# Patient Record
Sex: FEMALE | Race: WHITE | NOT HISPANIC OR LATINO
[De-identification: names, ages, dates, MRNs, and addresses within clinical notes are randomized per-mention and may not be internally consistent; named-entity substitution may affect disease eponyms.]

---

## 2022-02-02 ENCOUNTER — APPOINTMENT (OUTPATIENT)
Dept: ANTEPARTUM | Facility: CLINIC | Age: 32
End: 2022-02-02

## 2022-02-02 PROBLEM — Z00.00 ENCOUNTER FOR PREVENTIVE HEALTH EXAMINATION: Status: ACTIVE | Noted: 2022-02-02

## 2022-02-07 ENCOUNTER — APPOINTMENT (OUTPATIENT)
Dept: ANTEPARTUM | Facility: CLINIC | Age: 32
End: 2022-02-07
Payer: COMMERCIAL

## 2022-02-07 ENCOUNTER — ASOB RESULT (OUTPATIENT)
Age: 32
End: 2022-02-07

## 2022-02-07 PROCEDURE — 76819 FETAL BIOPHYS PROFIL W/O NST: CPT

## 2022-02-07 PROCEDURE — 76805 OB US >/= 14 WKS SNGL FETUS: CPT

## 2022-02-09 ENCOUNTER — OUTPATIENT (OUTPATIENT)
Dept: INPATIENT UNIT | Facility: HOSPITAL | Age: 32
LOS: 1 days | Discharge: HOME | End: 2022-02-09
Payer: COMMERCIAL

## 2022-02-09 ENCOUNTER — TRANSCRIPTION ENCOUNTER (OUTPATIENT)
Age: 32
End: 2022-02-09

## 2022-02-09 VITALS — HEART RATE: 95 BPM | DIASTOLIC BLOOD PRESSURE: 71 MMHG | SYSTOLIC BLOOD PRESSURE: 127 MMHG

## 2022-02-09 VITALS
TEMPERATURE: 99 F | SYSTOLIC BLOOD PRESSURE: 127 MMHG | RESPIRATION RATE: 16 BRPM | HEART RATE: 96 BPM | DIASTOLIC BLOOD PRESSURE: 87 MMHG

## 2022-02-09 DIAGNOSIS — O26.899 OTHER SPECIFIED PREGNANCY RELATED CONDITIONS, UNSPECIFIED TRIMESTER: ICD-10-CM

## 2022-02-09 DIAGNOSIS — Z3A.00 WEEKS OF GESTATION OF PREGNANCY NOT SPECIFIED: ICD-10-CM

## 2022-02-09 LAB
ALBUMIN SERPL ELPH-MCNC: 3.8 G/DL — SIGNIFICANT CHANGE UP (ref 3.3–5)
ALP SERPL-CCNC: 116 U/L — SIGNIFICANT CHANGE UP (ref 40–120)
ALT FLD-CCNC: 12 U/L — SIGNIFICANT CHANGE UP (ref 4–33)
ANION GAP SERPL CALC-SCNC: 10 MMOL/L — SIGNIFICANT CHANGE UP (ref 7–14)
APPEARANCE UR: ABNORMAL
APTT BLD: 25.9 SEC — LOW (ref 27–36.3)
AST SERPL-CCNC: 17 U/L — SIGNIFICANT CHANGE UP (ref 4–32)
BACTERIA # UR AUTO: NEGATIVE — SIGNIFICANT CHANGE UP
BASOPHILS # BLD AUTO: 0.04 K/UL — SIGNIFICANT CHANGE UP (ref 0–0.2)
BASOPHILS NFR BLD AUTO: 0.3 % — SIGNIFICANT CHANGE UP (ref 0–2)
BILIRUB SERPL-MCNC: 0.2 MG/DL — SIGNIFICANT CHANGE UP (ref 0.2–1.2)
BILIRUB UR-MCNC: NEGATIVE — SIGNIFICANT CHANGE UP
BUN SERPL-MCNC: 6 MG/DL — LOW (ref 7–23)
CALCIUM SERPL-MCNC: 9.1 MG/DL — SIGNIFICANT CHANGE UP (ref 8.4–10.5)
CHLORIDE SERPL-SCNC: 104 MMOL/L — SIGNIFICANT CHANGE UP (ref 98–107)
CO2 SERPL-SCNC: 22 MMOL/L — SIGNIFICANT CHANGE UP (ref 22–31)
COLOR SPEC: SIGNIFICANT CHANGE UP
CREAT ?TM UR-MCNC: 38 MG/DL — SIGNIFICANT CHANGE UP
CREAT SERPL-MCNC: 0.45 MG/DL — LOW (ref 0.5–1.3)
DIFF PNL FLD: NEGATIVE — SIGNIFICANT CHANGE UP
EOSINOPHIL # BLD AUTO: 0.06 K/UL — SIGNIFICANT CHANGE UP (ref 0–0.5)
EOSINOPHIL NFR BLD AUTO: 0.5 % — SIGNIFICANT CHANGE UP (ref 0–6)
EPI CELLS # UR: 1 /HPF — SIGNIFICANT CHANGE UP (ref 0–5)
FIBRINOGEN PPP-MCNC: 572 MG/DL — HIGH (ref 290–520)
GLUCOSE SERPL-MCNC: 83 MG/DL — SIGNIFICANT CHANGE UP (ref 70–99)
GLUCOSE UR QL: NEGATIVE — SIGNIFICANT CHANGE UP
HCT VFR BLD CALC: 33.3 % — LOW (ref 34.5–45)
HGB BLD-MCNC: 11.6 G/DL — SIGNIFICANT CHANGE UP (ref 11.5–15.5)
HYALINE CASTS # UR AUTO: 1 /LPF — SIGNIFICANT CHANGE UP (ref 0–7)
IANC: 9.65 K/UL — HIGH (ref 1.5–8.5)
IMM GRANULOCYTES NFR BLD AUTO: 0.8 % — SIGNIFICANT CHANGE UP (ref 0–1.5)
INR BLD: 0.94 RATIO — SIGNIFICANT CHANGE UP (ref 0.88–1.16)
KETONES UR-MCNC: NEGATIVE — SIGNIFICANT CHANGE UP
LDH SERPL L TO P-CCNC: 144 U/L — SIGNIFICANT CHANGE UP (ref 135–225)
LEUKOCYTE ESTERASE UR-ACNC: NEGATIVE — SIGNIFICANT CHANGE UP
LYMPHOCYTES # BLD AUTO: 1.81 K/UL — SIGNIFICANT CHANGE UP (ref 1–3.3)
LYMPHOCYTES # BLD AUTO: 14.7 % — SIGNIFICANT CHANGE UP (ref 13–44)
MCHC RBC-ENTMCNC: 29.8 PG — SIGNIFICANT CHANGE UP (ref 27–34)
MCHC RBC-ENTMCNC: 34.8 GM/DL — SIGNIFICANT CHANGE UP (ref 32–36)
MCV RBC AUTO: 85.6 FL — SIGNIFICANT CHANGE UP (ref 80–100)
MONOCYTES # BLD AUTO: 0.69 K/UL — SIGNIFICANT CHANGE UP (ref 0–0.9)
MONOCYTES NFR BLD AUTO: 5.6 % — SIGNIFICANT CHANGE UP (ref 2–14)
NEUTROPHILS # BLD AUTO: 9.65 K/UL — HIGH (ref 1.8–7.4)
NEUTROPHILS NFR BLD AUTO: 78.1 % — HIGH (ref 43–77)
NITRITE UR-MCNC: NEGATIVE — SIGNIFICANT CHANGE UP
NRBC # BLD: 0 /100 WBCS — SIGNIFICANT CHANGE UP
NRBC # FLD: 0 K/UL — SIGNIFICANT CHANGE UP
PH UR: 8 — SIGNIFICANT CHANGE UP (ref 5–8)
PLATELET # BLD AUTO: 281 K/UL — SIGNIFICANT CHANGE UP (ref 150–400)
POTASSIUM SERPL-MCNC: 4.1 MMOL/L — SIGNIFICANT CHANGE UP (ref 3.5–5.3)
POTASSIUM SERPL-SCNC: 4.1 MMOL/L — SIGNIFICANT CHANGE UP (ref 3.5–5.3)
PROT ?TM UR-MCNC: 6 MG/DL — SIGNIFICANT CHANGE UP
PROT ?TM UR-MCNC: 6 MG/DL — SIGNIFICANT CHANGE UP
PROT SERPL-MCNC: 6.5 G/DL — SIGNIFICANT CHANGE UP (ref 6–8.3)
PROT UR-MCNC: NEGATIVE — SIGNIFICANT CHANGE UP
PROT/CREAT UR-RTO: 0.2 RATIO — SIGNIFICANT CHANGE UP (ref 0–0.2)
PROTHROM AB SERPL-ACNC: 10.8 SEC — SIGNIFICANT CHANGE UP (ref 10.6–13.6)
RBC # BLD: 3.89 M/UL — SIGNIFICANT CHANGE UP (ref 3.8–5.2)
RBC # FLD: 12.9 % — SIGNIFICANT CHANGE UP (ref 10.3–14.5)
RBC CASTS # UR COMP ASSIST: 0 /HPF — SIGNIFICANT CHANGE UP (ref 0–4)
SODIUM SERPL-SCNC: 136 MMOL/L — SIGNIFICANT CHANGE UP (ref 135–145)
SP GR SPEC: 1.01 — SIGNIFICANT CHANGE UP (ref 1–1.05)
URATE SERPL-MCNC: 4 MG/DL — SIGNIFICANT CHANGE UP (ref 2.5–7)
UROBILINOGEN FLD QL: SIGNIFICANT CHANGE UP
WBC # BLD: 12.35 K/UL — HIGH (ref 3.8–10.5)
WBC # FLD AUTO: 12.35 K/UL — HIGH (ref 3.8–10.5)
WBC UR QL: 2 /HPF — SIGNIFICANT CHANGE UP (ref 0–5)

## 2022-02-09 PROCEDURE — 99203 OFFICE O/P NEW LOW 30 MIN: CPT

## 2022-02-09 RX ORDER — ACETAMINOPHEN 500 MG
1000 TABLET ORAL ONCE
Refills: 0 | Status: COMPLETED | OUTPATIENT
Start: 2022-02-09 | End: 2022-02-09

## 2022-02-09 RX ADMIN — Medication 1000 MILLIGRAM(S): at 12:30

## 2022-02-09 NOTE — OB RN TRIAGE NOTE - NS_GESTAGE_OBGYN_ALL_OB_FT
PT DAILY TREATMENT NOTE     Patient Name: Elisa Woods  Date:2019  : 1961  [x]  Patient  Verified  Payor: Brianna Screws / Plan: [employer]  CAPITATED PT / Product Type: Commerical /    In time: 9:32 am       Out time: 10:55 am  Total Treatment Time (min):  83  Visit #: 12 of     Treatment Area: Knee pain, right [M25.561]  Acute postoperative pain [G89.18]    SUBJECTIVE  Pain Level (0-10 scale): 1  Any medication changes, allergies to medications, adverse drug reactions, diagnosis change, or new procedure performed?: [x] No    [] Yes (see summary sheet for update)  Subjective functional status/changes:   [] No changes reported  \"Doing so much better than I did before. I go back to work on Wednesday. \"    OBJECTIVE  Modality rationale: decrease inflammation and decrease pain to improve the patients ability to ambulate, stairs , squat   Min Type Additional Details   10 [x]  Vasopneumatic Device Pressure:       [] lo [x] med [] hi   Temperature: [x] lo [] med [] hi   [x] Skin assessment post-treatment:  [x]intact []redness- no adverse reaction       []redness - adverse reaction:     61 min Therapeutic Exercise:  [x] See flow sheet: (-12 minutes for FOTO completion)   Rationale: increase ROM, increase strength and improve coordination to improve the patients ability to ambulate, stairs, drive, dressing    12 min Manual Therapy: tibial mobs grade IV; (R) knee PROM with PNF stretching of posterior chain; stick-rolling STM to RF; reassessment   Rationale: decrease pain, increase ROM and increase tissue extensibility to improve movement for gait, ADLs, stairs         X min Patient Education: [x] Review HEP     Other Objective/Functional Measures:   Subjective improvement of 95% in symptoms since IE reported.   Improvements: gait quality, ADLs, \"I can pretty much do everything\", transfers  Deficits: stair negotiation (descent>ascent), ice skating, normalized gait  FOTO score: 61/100 (at last assessment, 35/100)  (R) knee A/PROM: 3-120 deg  (R) knee strength: 5/5 within available ROM  (R) hip strength: flex 4+/5, abd 4/5, ext 5/5 (glut max 4+/5)  Core strength: 100% bridge  Edema via girth measurements:    @ mid-patella 40.5 cm   @ 2 inches above patella 41 cm   @ 2 inches below patella 36.5 cm  Pain: (B) 0, (W) 3    Pain Level (0-10 scale) post treatment: 1    ASSESSMENT/Changes in Function:   See PN. Patient will continue to benefit from skilled PT services to modify and progress therapeutic interventions, address functional mobility deficits, address ROM deficits, address strength deficits, analyze and address soft tissue restrictions, analyze and cue movement patterns, analyze and modify body mechanics/ergonomics, assess and modify postural abnormalities, address imbalance/dizziness and instruct in home and community integration to attain remaining goals. []  See Plan of Care  [x]  See progress note/recertification (4/75/35)  []  See Discharge Summary         Progress towards goals / Updated goals:  1) Patient  independent with HEP. -Goal met; pt notes strict HEP compliance  2) Patient will improve R knee AROM to 0-120 degrees to facilitate gait, transfers, driving. -Goal progressing; knee AROM 3-120 deg  3) Increase FOTO to 62 indicating improved function and quality of life. -Goal near met; 61/100 FOTO score  4) Patient will increase R knee strength in quad to 5 so patient has improved ability to descend steps reciprocal, and ambulate with TKE and normal gait.  -Goal met; pt demos 5/5 knee EXT strength   5) Patient able to stand for >1hr without AD to progress towards return to work. -Goal met; pt notes ability to stand > 1 hour    PLAN  [x]  Upgrade activities as tolerated     [x]  Continue plan of care  []  Update interventions per flow sheet       []  Discharge due to:_  [x]  Other: see PN; cont for remaining scheduled treatments, then decrease frequency to 1x weekly for 4 weeks total    Amirah Bee Ivy Hilton, PTA  4/23/2019 32w4d

## 2022-02-09 NOTE — OB PROVIDER TRIAGE NOTE - HISTORY OF PRESENT ILLNESS
's patient is a 32 y/o EDC 2022 EGA 32   reports of headache for last couple days. At this time patient reports headache as 6 out 10 on numeric pain scale. Took Tylenol over 24 hours ago. Patient denies visual disturbances, nausea/vomiting, right upper quadrant pain. Patient reports of fetal movement. Denies loss of fluid, vaginal bleeding, cramping, and/or contractions.     AP complications: TIUP, 8 week demise of a twin in this pregnancy  Covid: positive on 2022, reports at that time having headache, body ache, congestation  Medical History: TMJ  Surgical History: Denies  OBGYN History: HPV, coloposcopy at 20 y/o   Mental History: Pandemic related, anxiety, hyperventilation, and urology consult for painful urination ( pt reports anxiety manifested in urinary symptoms)   's patient is a 30 y/o EDC 2022 EGA 32   reports of headache for last couple days. At this time patient reports headache as 6 out 10 on numeric pain scale. Took Tylenol over 24 hours ago. Patient denies visual disturbances, nausea/vomiting, right upper quadrant pain. Patient reports of fetal movement. Denies loss of fluid, vaginal bleeding, cramping, and/or contractions.     AP complications: TIUP, 8 week demise of a twin in this pregnancy  ATU sono 2022: cephalic presentation, anterior placenta, no previa, JUAN ANTONIO 12.47,  BPP, EFW 1895 (32%tile)  Covid: positive on 2022, reports at that time having headache, body ache, congestation  Medical History: TMJ  Surgical History: Denies  OBGYN History: HPV, coloposcopy at 20 y/o   Mental History: Pandemic related, anxiety, hyperventilation, and urology consult for painful urination ( pt reports anxiety manifested in urinary symptoms)

## 2022-02-09 NOTE — OB RN TRIAGE NOTE - FALL HARM RISK - UNIVERSAL INTERVENTIONS
Bed in lowest position, wheels locked, appropriate side rails in place/Call bell, personal items and telephone in reach/Instruct patient to call for assistance before getting out of bed or chair/Non-slip footwear when patient is out of bed/[place] to call system/Physically safe environment - no spills, clutter or unnecessary equipment/Purposeful Proactive Rounding/Room/bathroom lighting operational, light cord in reach

## 2022-02-09 NOTE — OB PROVIDER TRIAGE NOTE - NSHPPHYSICALEXAM_GEN_ALL_CORE
Vital Signs Last 24 Hrs  T(C): 37.2 (09 Feb 2022 09:46), Max: 37.2 (09 Feb 2022 09:46)  T(F): 99 (09 Feb 2022 09:46), Max: 99 (09 Feb 2022 09:46)  HR: 82 (09 Feb 2022 12:29) (82 - 96)  BP: 138/77 (09 Feb 2022 12:29) (127/87 - 138/77)  RR: 16 (09 Feb 2022 09:46) (16 - 16)  TAS: deferred, completed earlier this weeek   FHR:  CTX: Vital Signs Last 24 Hrs  T(C): 37.2 (09 Feb 2022 09:46), Max: 37.2 (09 Feb 2022 09:46)  T(F): 99 (09 Feb 2022 09:46), Max: 99 (09 Feb 2022 09:46)  HR: 82 (09 Feb 2022 12:29) (82 - 96)  BP: 138/77 (09 Feb 2022 12:29) (127/87 - 138/77)  RR: 16 (09 Feb 2022 09:46) (16 - 16)  TAS: deferred, completed earlier this week   FHR: 140 baseline with moderate variability, accelerations present, no decelerations  CTX: irritability present

## 2022-02-09 NOTE — OB PROVIDER TRIAGE NOTE - ADDITIONAL INSTRUCTIONS
follow up with PMD on 2/15/2022. Signs and symptoms of preeclampsia and fetal movements count reviewed.

## 2022-03-21 ENCOUNTER — INPATIENT (INPATIENT)
Facility: HOSPITAL | Age: 32
LOS: 1 days | Discharge: HOME | End: 2022-03-23
Attending: OBSTETRICS & GYNECOLOGY | Admitting: OBSTETRICS & GYNECOLOGY

## 2022-03-21 ENCOUNTER — TRANSCRIPTION ENCOUNTER (OUTPATIENT)
Age: 32
End: 2022-03-21

## 2022-03-21 VITALS — SYSTOLIC BLOOD PRESSURE: 144 MMHG | HEART RATE: 77 BPM | DIASTOLIC BLOOD PRESSURE: 82 MMHG

## 2022-03-21 DIAGNOSIS — K08.409 PARTIAL LOSS OF TEETH, UNSPECIFIED CAUSE, UNSPECIFIED CLASS: Chronic | ICD-10-CM

## 2022-03-21 DIAGNOSIS — O13.3 GESTATIONAL [PREGNANCY-INDUCED] HYPERTENSION WITHOUT SIGNIFICANT PROTEINURIA, THIRD TRIMESTER: ICD-10-CM

## 2022-03-21 PROBLEM — Z87.898 PERSONAL HISTORY OF OTHER SPECIFIED CONDITIONS: Chronic | Status: ACTIVE | Noted: 2022-02-09

## 2022-03-21 PROBLEM — F41.9 ANXIETY DISORDER, UNSPECIFIED: Chronic | Status: ACTIVE | Noted: 2022-02-09

## 2022-03-21 LAB
ALBUMIN SERPL ELPH-MCNC: 3.7 G/DL — SIGNIFICANT CHANGE UP (ref 3.3–5)
ALP SERPL-CCNC: 146 U/L — HIGH (ref 40–120)
ALT FLD-CCNC: 11 U/L — SIGNIFICANT CHANGE UP (ref 4–33)
ANION GAP SERPL CALC-SCNC: 14 MMOL/L — SIGNIFICANT CHANGE UP (ref 7–14)
APPEARANCE UR: CLEAR — SIGNIFICANT CHANGE UP
APTT BLD: 25 SEC — LOW (ref 27–36.3)
AST SERPL-CCNC: 21 U/L — SIGNIFICANT CHANGE UP (ref 4–32)
BACTERIA # UR AUTO: ABNORMAL
BASOPHILS # BLD AUTO: 0.04 K/UL — SIGNIFICANT CHANGE UP (ref 0–0.2)
BASOPHILS NFR BLD AUTO: 0.3 % — SIGNIFICANT CHANGE UP (ref 0–2)
BILIRUB SERPL-MCNC: <0.2 MG/DL — SIGNIFICANT CHANGE UP (ref 0.2–1.2)
BILIRUB UR-MCNC: NEGATIVE — SIGNIFICANT CHANGE UP
BLD GP AB SCN SERPL QL: NEGATIVE — SIGNIFICANT CHANGE UP
BUN SERPL-MCNC: 8 MG/DL — SIGNIFICANT CHANGE UP (ref 7–23)
CALCIUM SERPL-MCNC: 9.8 MG/DL — SIGNIFICANT CHANGE UP (ref 8.4–10.5)
CHLORIDE SERPL-SCNC: 103 MMOL/L — SIGNIFICANT CHANGE UP (ref 98–107)
CO2 SERPL-SCNC: 18 MMOL/L — LOW (ref 22–31)
COLOR SPEC: SIGNIFICANT CHANGE UP
COVID-19 SPIKE DOMAIN AB INTERP: POSITIVE
COVID-19 SPIKE DOMAIN ANTIBODY RESULT: >250 U/ML — HIGH
CREAT ?TM UR-MCNC: 42 MG/DL — SIGNIFICANT CHANGE UP
CREAT SERPL-MCNC: 0.44 MG/DL — LOW (ref 0.5–1.3)
DIFF PNL FLD: ABNORMAL
EGFR: 133 ML/MIN/1.73M2 — SIGNIFICANT CHANGE UP
EOSINOPHIL # BLD AUTO: 0.07 K/UL — SIGNIFICANT CHANGE UP (ref 0–0.5)
EOSINOPHIL NFR BLD AUTO: 0.6 % — SIGNIFICANT CHANGE UP (ref 0–6)
EPI CELLS # UR: 4 /HPF — SIGNIFICANT CHANGE UP (ref 0–5)
FIBRINOGEN PPP-MCNC: 556 MG/DL — HIGH (ref 330–520)
GLUCOSE SERPL-MCNC: 88 MG/DL — SIGNIFICANT CHANGE UP (ref 70–99)
GLUCOSE UR QL: NEGATIVE — SIGNIFICANT CHANGE UP
HCT VFR BLD CALC: 32.2 % — LOW (ref 34.5–45)
HGB BLD-MCNC: 11.2 G/DL — LOW (ref 11.5–15.5)
HYALINE CASTS # UR AUTO: 1 /LPF — SIGNIFICANT CHANGE UP (ref 0–7)
IANC: 9.68 K/UL — HIGH (ref 1.5–8.5)
IMM GRANULOCYTES NFR BLD AUTO: 0.6 % — SIGNIFICANT CHANGE UP (ref 0–1.5)
INR BLD: <0.9 RATIO — LOW (ref 0.88–1.16)
KETONES UR-MCNC: NEGATIVE — SIGNIFICANT CHANGE UP
LDH SERPL L TO P-CCNC: 228 U/L — HIGH (ref 135–225)
LEUKOCYTE ESTERASE UR-ACNC: NEGATIVE — SIGNIFICANT CHANGE UP
LYMPHOCYTES # BLD AUTO: 1.89 K/UL — SIGNIFICANT CHANGE UP (ref 1–3.3)
LYMPHOCYTES # BLD AUTO: 15.1 % — SIGNIFICANT CHANGE UP (ref 13–44)
MCHC RBC-ENTMCNC: 29.6 PG — SIGNIFICANT CHANGE UP (ref 27–34)
MCHC RBC-ENTMCNC: 34.8 GM/DL — SIGNIFICANT CHANGE UP (ref 32–36)
MCV RBC AUTO: 85.2 FL — SIGNIFICANT CHANGE UP (ref 80–100)
MONOCYTES # BLD AUTO: 0.76 K/UL — SIGNIFICANT CHANGE UP (ref 0–0.9)
MONOCYTES NFR BLD AUTO: 6.1 % — SIGNIFICANT CHANGE UP (ref 2–14)
NEUTROPHILS # BLD AUTO: 9.68 K/UL — HIGH (ref 1.8–7.4)
NEUTROPHILS NFR BLD AUTO: 77.3 % — HIGH (ref 43–77)
NITRITE UR-MCNC: NEGATIVE — SIGNIFICANT CHANGE UP
NRBC # BLD: 0 /100 WBCS — SIGNIFICANT CHANGE UP
NRBC # FLD: 0 K/UL — SIGNIFICANT CHANGE UP
PH UR: 6.5 — SIGNIFICANT CHANGE UP (ref 5–8)
PLATELET # BLD AUTO: 254 K/UL — SIGNIFICANT CHANGE UP (ref 150–400)
POTASSIUM SERPL-MCNC: 4.1 MMOL/L — SIGNIFICANT CHANGE UP (ref 3.5–5.3)
POTASSIUM SERPL-SCNC: 4.1 MMOL/L — SIGNIFICANT CHANGE UP (ref 3.5–5.3)
PROT ?TM UR-MCNC: 8 MG/DL — SIGNIFICANT CHANGE UP
PROT ?TM UR-MCNC: 8 MG/DL — SIGNIFICANT CHANGE UP
PROT SERPL-MCNC: 6.6 G/DL — SIGNIFICANT CHANGE UP (ref 6–8.3)
PROT UR-MCNC: NEGATIVE — SIGNIFICANT CHANGE UP
PROT/CREAT UR-RTO: 0.2 RATIO — SIGNIFICANT CHANGE UP (ref 0–0.2)
PROTHROM AB SERPL-ACNC: 10.1 SEC — LOW (ref 10.5–13.4)
RBC # BLD: 3.78 M/UL — LOW (ref 3.8–5.2)
RBC # FLD: 12.7 % — SIGNIFICANT CHANGE UP (ref 10.3–14.5)
RBC CASTS # UR COMP ASSIST: 4 /HPF — SIGNIFICANT CHANGE UP (ref 0–4)
RH IG SCN BLD-IMP: POSITIVE — SIGNIFICANT CHANGE UP
RH IG SCN BLD-IMP: POSITIVE — SIGNIFICANT CHANGE UP
SARS-COV-2 IGG+IGM SERPL QL IA: >250 U/ML — HIGH
SARS-COV-2 IGG+IGM SERPL QL IA: POSITIVE
SODIUM SERPL-SCNC: 135 MMOL/L — SIGNIFICANT CHANGE UP (ref 135–145)
SP GR SPEC: 1.01 — SIGNIFICANT CHANGE UP (ref 1–1.05)
URATE SERPL-MCNC: 4.6 MG/DL — SIGNIFICANT CHANGE UP (ref 2.5–7)
UROBILINOGEN FLD QL: SIGNIFICANT CHANGE UP
WBC # BLD: 12.51 K/UL — HIGH (ref 3.8–10.5)
WBC # FLD AUTO: 12.51 K/UL — HIGH (ref 3.8–10.5)
WBC UR QL: 3 /HPF — SIGNIFICANT CHANGE UP (ref 0–5)

## 2022-03-21 RX ORDER — SIMETHICONE 80 MG/1
80 TABLET, CHEWABLE ORAL EVERY 4 HOURS
Refills: 0 | Status: DISCONTINUED | OUTPATIENT
Start: 2022-03-21 | End: 2022-03-23

## 2022-03-21 RX ORDER — AER TRAVELER 0.5 G/1
1 SOLUTION RECTAL; TOPICAL EVERY 4 HOURS
Refills: 0 | Status: DISCONTINUED | OUTPATIENT
Start: 2022-03-21 | End: 2022-03-23

## 2022-03-21 RX ORDER — OXYCODONE HYDROCHLORIDE 5 MG/1
5 TABLET ORAL
Refills: 0 | Status: DISCONTINUED | OUTPATIENT
Start: 2022-03-21 | End: 2022-03-23

## 2022-03-21 RX ORDER — OXYTOCIN 10 UNIT/ML
333.33 VIAL (ML) INJECTION
Qty: 20 | Refills: 0 | Status: DISCONTINUED | OUTPATIENT
Start: 2022-03-21 | End: 2022-03-22

## 2022-03-21 RX ORDER — BENZOCAINE 10 %
1 GEL (GRAM) MUCOUS MEMBRANE EVERY 6 HOURS
Refills: 0 | Status: DISCONTINUED | OUTPATIENT
Start: 2022-03-21 | End: 2022-03-23

## 2022-03-21 RX ORDER — HYDROCORTISONE 1 %
1 OINTMENT (GRAM) TOPICAL EVERY 6 HOURS
Refills: 0 | Status: DISCONTINUED | OUTPATIENT
Start: 2022-03-21 | End: 2022-03-23

## 2022-03-21 RX ORDER — ACETAMINOPHEN 500 MG
3 TABLET ORAL
Qty: 0 | Refills: 0 | DISCHARGE
Start: 2022-03-21

## 2022-03-21 RX ORDER — IBUPROFEN 200 MG
1 TABLET ORAL
Qty: 0 | Refills: 0 | DISCHARGE
Start: 2022-03-21

## 2022-03-21 RX ORDER — OXYCODONE HYDROCHLORIDE 5 MG/1
5 TABLET ORAL ONCE
Refills: 0 | Status: DISCONTINUED | OUTPATIENT
Start: 2022-03-21 | End: 2022-03-23

## 2022-03-21 RX ORDER — DIBUCAINE 1 %
1 OINTMENT (GRAM) RECTAL EVERY 6 HOURS
Refills: 0 | Status: DISCONTINUED | OUTPATIENT
Start: 2022-03-21 | End: 2022-03-23

## 2022-03-21 RX ORDER — IBUPROFEN 200 MG
600 TABLET ORAL EVERY 6 HOURS
Refills: 0 | Status: COMPLETED | OUTPATIENT
Start: 2022-03-21 | End: 2023-02-17

## 2022-03-21 RX ORDER — CITRIC ACID/SODIUM CITRATE 300-500 MG
15 SOLUTION, ORAL ORAL EVERY 6 HOURS
Refills: 0 | Status: DISCONTINUED | OUTPATIENT
Start: 2022-03-21 | End: 2022-03-22

## 2022-03-21 RX ORDER — TETANUS TOXOID, REDUCED DIPHTHERIA TOXOID AND ACELLULAR PERTUSSIS VACCINE, ADSORBED 5; 2.5; 8; 8; 2.5 [IU]/.5ML; [IU]/.5ML; UG/.5ML; UG/.5ML; UG/.5ML
0.5 SUSPENSION INTRAMUSCULAR ONCE
Refills: 0 | Status: DISCONTINUED | OUTPATIENT
Start: 2022-03-21 | End: 2022-03-23

## 2022-03-21 RX ORDER — OXYTOCIN 10 UNIT/ML
2 VIAL (ML) INJECTION
Qty: 30 | Refills: 0 | Status: DISCONTINUED | OUTPATIENT
Start: 2022-03-21 | End: 2022-03-22

## 2022-03-21 RX ORDER — MAGNESIUM HYDROXIDE 400 MG/1
30 TABLET, CHEWABLE ORAL
Refills: 0 | Status: DISCONTINUED | OUTPATIENT
Start: 2022-03-21 | End: 2022-03-23

## 2022-03-21 RX ORDER — SODIUM CHLORIDE 9 MG/ML
3 INJECTION INTRAMUSCULAR; INTRAVENOUS; SUBCUTANEOUS EVERY 8 HOURS
Refills: 0 | Status: DISCONTINUED | OUTPATIENT
Start: 2022-03-21 | End: 2022-03-23

## 2022-03-21 RX ORDER — ACETAMINOPHEN 500 MG
975 TABLET ORAL
Refills: 0 | Status: DISCONTINUED | OUTPATIENT
Start: 2022-03-21 | End: 2022-03-23

## 2022-03-21 RX ORDER — SODIUM CHLORIDE 9 MG/ML
1000 INJECTION, SOLUTION INTRAVENOUS
Refills: 0 | Status: DISCONTINUED | OUTPATIENT
Start: 2022-03-21 | End: 2022-03-22

## 2022-03-21 RX ORDER — LANOLIN
1 OINTMENT (GRAM) TOPICAL EVERY 6 HOURS
Refills: 0 | Status: DISCONTINUED | OUTPATIENT
Start: 2022-03-21 | End: 2022-03-23

## 2022-03-21 RX ORDER — KETOROLAC TROMETHAMINE 30 MG/ML
30 SYRINGE (ML) INJECTION ONCE
Refills: 0 | Status: DISCONTINUED | OUTPATIENT
Start: 2022-03-21 | End: 2022-03-21

## 2022-03-21 RX ORDER — DIPHENHYDRAMINE HCL 50 MG
25 CAPSULE ORAL EVERY 6 HOURS
Refills: 0 | Status: DISCONTINUED | OUTPATIENT
Start: 2022-03-21 | End: 2022-03-23

## 2022-03-21 RX ORDER — PRAMOXINE HYDROCHLORIDE 150 MG/15G
1 AEROSOL, FOAM RECTAL EVERY 4 HOURS
Refills: 0 | Status: DISCONTINUED | OUTPATIENT
Start: 2022-03-21 | End: 2022-03-23

## 2022-03-21 NOTE — OB NEONATOLOGY/PEDIATRICIAN DELIVERY SUMMARY - NSPEDSNEONOTESA_OBGYN_ALL_OB_FT
Baby girl born @ 38.2 weeks via  to a 32 y/o A+  mother.  Maternal hx GHTN, anxiety. No significant prenatal hx.  Prenatal labs NR/immune/neg.  GBS neg on 3/10.  COVID neg. SROM at 11:15 on 3/21, clear fluids.  Baby emerged vigorous with good cry.  Delayed cord clamping for 60 seconds. W/d/s/s with APGARs of 9/9.   Mom plans to bottlefeed and consents hepB.  EOS 0.12.

## 2022-03-21 NOTE — OB PROVIDER H&P - NSHPROSALLOTHERNEGRD_GEN_ALL_CORE
Area M Indication Text: Tumors in this location are included in Area M (cheek, forehead, scalp, neck, jawline and pretibial skin).  Mohs surgery is indicated for tumors in these anatomic locations. All other review of systems negative, except as noted in HPI

## 2022-03-21 NOTE — OB RN DELIVERY SUMMARY - NSSELHIDDEN_OBGYN_ALL_OB_FT
[NS_DeliveryAttending1_OBGYN_ALL_OB_FT:[id number]],[NS_DeliveryAssist1_OBGYN_ALL_OB_FT:[id number]],[NS_DeliveryRN_OBGYN_ALL_OB_FT:[id number]]

## 2022-03-21 NOTE — DISCHARGE NOTE OB - HOSPITAL COURSE
Admitted for PROM.  Pregnancy complicated by gHTN.  Cytotec induction of labor.  Epidural for pain control.  Progressed into labor.   3/21/2022, viable female infant.

## 2022-03-21 NOTE — OB RN DELIVERY SUMMARY - NS_SEPSISRSKCALC_OBGYN_ALL_OB_FT
EOS calculated successfully. EOS Risk Factor: 0.5/1000 live births ([facility] national incidence); GA=38w2d; Temp=98.8; ROM=11.183; GBS='Negative'; Antibiotics='No antibiotics or any antibiotics < 2 hrs prior to birth'

## 2022-03-21 NOTE — OB PROVIDER LABOR PROGRESS NOTE - NS_SUBJECTIVE/OBJECTIVE_OBGYN_ALL_OB_FT
PGY1 Labor & Delivery Progress Note     Pt examined @ 0409 due to    T(C): 37.1 (03-21-22 @ 17:44), Max: 37.4 (03-21-22 @ 15:29)  HR: 65 (03-21-22 @ 17:44) (65 - 77)  BP: 113/68 (03-21-22 @ 17:44) (113/68 - 144/82)  RR: 17 (03-21-22 @ 17:44) (16 - 18)  SpO2: 99% (03-21-22 @ 17:44) (98% - 99%) PGY1 Labor & Delivery Progress Note     Pt examined @ 1725 due to    T(C): 37.1 (03-21-22 @ 17:44), Max: 37.4 (03-21-22 @ 15:29)  HR: 65 (03-21-22 @ 17:44) (65 - 77)  BP: 113/68 (03-21-22 @ 17:44) (113/68 - 144/82)  RR: 17 (03-21-22 @ 17:44) (16 - 18)  SpO2: 99% (03-21-22 @ 17:44) (98% - 99%)

## 2022-03-21 NOTE — OB PROVIDER DELIVERY SUMMARY - NSSELHIDDEN_OBGYN_ALL_OB_FT
[NS_DeliveryAttending1_OBGYN_ALL_OB_FT:[id number]],[NS_DeliveryAssist1_OBGYN_ALL_OB_FT:[id number]]

## 2022-03-21 NOTE — OB PROVIDER H&P - ASSESSMENT
31 year old  @ 38.2 weeks, admitted to L&D with premature rupture of membranes, clear at 11:15 AM today, with gestational hypertension, normal to mild range blood pressures, asymptomatic, Cat I tracing, irregular contractions, vital signs stable  - Admit to L&D  - Clear Liquid Diet  - Continuous EFM/toco  - IV access, CBC/T&C/RPR, HELLP labs  - Anesthesia consult   - History of anxiety, consider SW consult postpartum  - Closely monitor signs and symptoms of pre-eclampsia   - Induction agent - Oral Cytotec  - Re-evaluate SVE in 6 hours or sooner if clinically indicated  - Discussed with Dr. Rodrigez

## 2022-03-21 NOTE — DISCHARGE NOTE OB - NURSING SECTION COMPLETE
ED Time Seen By Provider Entered On:  11/17/2017 6:46     Performed On:  11/17/2017 6:46  by Victor Manuel Garcia MD      Time Seen By Provider   Time Seen by Provider :   11/17/2017 06:46    Victor Manuel Garcia MD - 11/17/2017 6:46            Patient/Caregiver provided printed discharge information.

## 2022-03-21 NOTE — DISCHARGE NOTE OB - PATIENT PORTAL LINK FT
You can access the FollowMyHealth Patient Portal offered by [facility] by registering at the following website: [email or web address]. By joining [employer]’s FollowMyHealth portal, you will also be able to view your health information using other applications (apps) compatible with our system.

## 2022-03-21 NOTE — DISCHARGE NOTE OB - CARE PLAN
Assessment and plan of treatment:	nothing per vagina x 6weeks, follow-up appointment in 1 week for BP check, and in 6 weeks for postpartum visit   1 Principal Discharge DX:	Normal vaginal delivery  Assessment and plan of treatment:	nothing per vagina x 6weeks, follow-up appointment in 1 week for BP check, and in 6 weeks for postpartum visit

## 2022-03-21 NOTE — OB PROVIDER LABOR PROGRESS NOTE - ASSESSMENT
A/P:   31y  @ 38.2wga admitted for IOL in the setting of PPROM    #Labor   - s/p PO Misoprostol  - Will be for Oxytocin after tracing recovers     #Fetal Wellbeing   - Cat 2. Tracing had improved spontaneously with fluid bolus and positioning (pt had received and epidural ~30min prior)  - BPs 120/80s    # Issues   - Will continue to monitor BPs in the setting of gHTN    #Pain Control   - Pt w/ Epidural     Mir Yang, PGY-1    d/w Dr. Rodrigez 
A/P:   31y  @ 38.2wga admitted for IOL in the setting of PPROM    #Labor   - s/p PO Misoprostol  - Will be for Oxytocin after epi and being moved to the labor floor     #Fetal Wellbeing   - Cat 1    # Issues   - Will continue to monitor BPs in the setting of gHTN    #Pain Control   - Will arrange for epidural after patient is moved to the labor floor     Mir Yang, PGY-1    d/w Dr. Rodrigez

## 2022-03-21 NOTE — OB RN PATIENT PROFILE - FALL HARM RISK - UNIVERSAL INTERVENTIONS
Bed in lowest position, wheels locked, appropriate side rails in place/Call bell, personal items and telephone in reach/Instruct patient to call for assistance before getting out of bed or chair/Non-slip footwear when patient is out of bed/[place] to call system/Physically safe environment - no spills, clutter or unnecessary equipment/Purposeful Proactive Rounding/Room/bathroom lighting operational, light cord in reach

## 2022-03-21 NOTE — DISCHARGE NOTE OB - CARE PROVIDER_API CALL
Janine Pérez)  Obstetrics and Gynecology  [address], [facility] [address]  [place] [address]  Phone: [phone]  Fax: [phone]  Follow Up Time:

## 2022-03-21 NOTE — OB PROVIDER LABOR PROGRESS NOTE - NS_SUBJECTIVE/OBJECTIVE_OBGYN_ALL_OB_FT
PGY1 Labor & Delivery Progress Note     Pt examined @ 2107 due to prolonged deceleration     T(C): 36.8 (03-21-22 @ 20:00), Max: 37.4 (03-21-22 @ 15:29)  HR: 56 (03-21-22 @ 21:16) (56 - 77)  BP: 109/56 (03-21-22 @ 21:16) (109/56 - 144/82)  RR: 17 (03-21-22 @ 17:44) (16 - 18)  SpO2: 100% (03-21-22 @ 21:15) (97% - 100%)

## 2022-03-21 NOTE — OB PROVIDER H&P - NS_OBGYNHISTORY_OBGYN_ALL_OB_FT
twin gestation pregnancy, vanishing twin at 8wks OB History: : Current pregnancy, with vanishing twin at 8 weeks, and complicated by gestational hypertension

## 2022-03-21 NOTE — OB PROVIDER H&P - NSHPLABSRESULTS_GEN_ALL_CORE
Prenatal Labs Reviewed:  T&S: A+  Rubella: Immune  Hep B: Neg  HIV: Neg  RPR: Neg  GBS: Neg 3/10    Ultrasounds:  2/7: Cephalic presentation, anterior placenta, no previa, JUAN ANTONIO wnl, BPP 8/8, EFW 1895 g (32%ile)

## 2022-03-21 NOTE — DISCHARGE NOTE OB - MEDICATION SUMMARY - MEDICATIONS TO TAKE
I will START or STAY ON the medications listed below when I get home from the hospital:    acetaminophen 325 mg oral tablet  -- 3 tab(s) by mouth every 6 hours, As Needed  -- Indication: For pain    ibuprofen 600 mg oral tablet  -- 1 tab(s) by mouth every 6 hours  -- Indication: For pain    Prenatal 1 oral capsule  -- 1  by mouth once a day  -- Indication: For supplement

## 2022-03-21 NOTE — OB PROVIDER H&P - NSHPSOCIALHISTORY_GEN_ALL_CORE
Patient denies tobacco/alcohol/cigarette/illicit drug use    Patient admits to anxiety, denies depression or other mental health issues

## 2022-03-21 NOTE — DISCHARGE NOTE OB - MATERIALS PROVIDED
[facility] [place] Screening Program/[place]  Immunization Record/Bottle Feeding Log/Guide to Postpartum Care/[facility] Hearing Screen Program/Back To Sleep Handout/Shaken Baby Prevention Handout/Birth Certificate Instructions/Discharge Medication Information for Patients and Families Pocket Guide

## 2022-03-21 NOTE — OB PROVIDER H&P - HISTORY OF PRESENT ILLNESS
** year old G*P* @ ** weeks, dated by LMP ** consistent with 1st Trimester US, presents to L&D for **. Patient admits to normal fetal Admits to normal fetal movement. Denies vaginal bleeding, leakage of fluid, painful contractions/lower abdominal cramping, fever/chills, shortness of breath,  chest pain, increased swelling, difficulty ambulating, loss of taste/smell, nausea/vomiting/diarrhea, rash, weakness, paresthesia, change in appetite, dizziness, lightheadedness, cough, nasal congestion, runny nose    OB History: G**P**  G1:    GYN Hx: .gynhx    Med Hx: Denies asthma, HTN, DM, CAD, or other medical issues    Meds: PNV, denies other medications including prescribed/OTC     Allergies: NKDA, NKEA, NKFA    Surgical Hx: **      Vitals:  Gen: NAD, A+O x 3, resting comfortably  Resp: CTAB  Cardio: RRR  Abd: Gravid, soft, non-distended, non-tender to superficial and deep palpation in all 4 quadrants, no rebound/guarding  Ext: Warm, well perfused, 1+ nonpitting edema bilaterally    EFM: **  [place]: **    SSE:  SVE:     Bedside Transabdominal US: **  Bedside Tranasvaginal US: **   31 year old  @ 38.2 weeks, JOSEE 2022, dated consistent with 1st Trimester US, presents to L&D complaining of a gush of clear fluid, soaking through her underwear and pants at 11:15 AM. Patient states she is scheduled for the 4 pm IOL due to elevated blood pressures in pregnancy, but "only at the doctor office." Patient states she feels mild irregular "period-like" cramping, Patient states her BPs at home are always 115-120s/70s. Patient admits to normal fetal movement. Denies vaginal bleeding, leakage of fluid, painful contractions/lower abdominal cramping, fever/chills, shortness of breath,  chest pain, increased swelling, difficulty ambulating, loss of taste/smell, nausea/vomiting/diarrhea, rash, weakness, paresthesia, change in appetite, dizziness, lightheadedness, cough, nasal congestion, runny nose, headache, vision changes, right upper quadrant pain, epigastric pain, severe abdominal pain, increased swelling, SI/HI    OB History: : Current pregnancy, with vanishing twin at 8 weeks, and complicated by gestational hypertension    GYN Hx: Denies fibroids, ovarian cysts, STDs, abnormal pap smears     Med Hx: Anxiety since pandemic began, no meds, no longer followed by psych, TMJ, Denies asthma, HTN, DM, CAD, or other medical issues    Meds: PNV, Amoxicillin (recently diagnosed with root canal) denies other medications including prescribed/OTC     Allergies: Cefzil - "Rotted tooth", NKEA, NKFA    Surgical Hx: Patient denies previous surgeries     Vitals: ICU Vital Signs Last 24 Hrs  T(C): 37.1 (21 Mar 2022 13:46), Max: 37.1 (21 Mar 2022 13:46)  T(F): 98.8 (21 Mar 2022 13:46), Max: 98.8 (21 Mar 2022 13:46)  HR: 77 (21 Mar 2022 14:21) (75 - 77)  BP: 120/75 (21 Mar 2022 14:21) (120/75 - 144/82)  BP(mean): --  ABP: --  ABP(mean): --  RR: 16 (21 Mar 2022 13:46) (16 - 16)  SpO2: --    Gen: NAD, A+O x 3, resting comfortably  Resp: CTAB  Cardio: RRR  Abd: Gravid, soft, non-distended, non-tender to superficial and deep palpation in all 4 quadrants, no rebound/guarding  Ext: Warm, well perfused, 1+ nonpitting edema bilaterally    EFM: 135 bpm, moderate variability with spontaneous accelerations, no decelerations, Cat I tracing  [place]: irregular every 5-9 minutes    SSE: External genitalia without lesions/lacerations, mucosa pink without lesions, cervix visualized appears closed, no blood visualized, no blood clots, normal physiologic discharge present    ** Positive Pooling/Nitrazine/[place]  SVE: closed/long/high

## 2022-03-21 NOTE — OB PROVIDER DELIVERY SUMMARY - NSPROVIDERDELIVERYNOTE_OBGYN_ALL_OB_FT
Pt fully dilated and pushing.  Delivered viable infant over intact perineum.  Nose and mouth bulb suctioned.  Infant handed to mother.  Cord clamped and cut after delay. Cord gases sent.  Placenta delivered spontaneously and intact.  1st degree and left periurethral lacerations repaired with excellent hemostasis and restoration of anatomy.  Fundus firm.  Vault empty.

## 2022-03-22 LAB — T PALLIDUM AB TITR SER: NEGATIVE — SIGNIFICANT CHANGE UP

## 2022-03-22 RX ORDER — IBUPROFEN 200 MG
600 TABLET ORAL EVERY 6 HOURS
Refills: 0 | Status: DISCONTINUED | OUTPATIENT
Start: 2022-03-22 | End: 2022-03-23

## 2022-03-22 RX ADMIN — Medication 600 MILLIGRAM(S): at 23:52

## 2022-03-22 RX ADMIN — SODIUM CHLORIDE 3 MILLILITER(S): 9 INJECTION INTRAMUSCULAR; INTRAVENOUS; SUBCUTANEOUS at 06:12

## 2022-03-22 RX ADMIN — SODIUM CHLORIDE 3 MILLILITER(S): 9 INJECTION INTRAMUSCULAR; INTRAVENOUS; SUBCUTANEOUS at 22:25

## 2022-03-22 RX ADMIN — Medication 975 MILLIGRAM(S): at 21:15

## 2022-03-22 RX ADMIN — Medication 600 MILLIGRAM(S): at 06:00

## 2022-03-22 RX ADMIN — Medication 600 MILLIGRAM(S): at 18:19

## 2022-03-22 RX ADMIN — Medication 600 MILLIGRAM(S): at 07:00

## 2022-03-22 RX ADMIN — Medication 600 MILLIGRAM(S): at 11:22

## 2022-03-22 RX ADMIN — Medication 975 MILLIGRAM(S): at 22:00

## 2022-03-23 VITALS
RESPIRATION RATE: 18 BRPM | TEMPERATURE: 98 F | HEART RATE: 85 BPM | OXYGEN SATURATION: 99 % | SYSTOLIC BLOOD PRESSURE: 138 MMHG | DIASTOLIC BLOOD PRESSURE: 89 MMHG

## 2022-03-23 RX ADMIN — Medication 600 MILLIGRAM(S): at 06:12

## 2022-03-23 RX ADMIN — Medication 600 MILLIGRAM(S): at 12:27

## 2022-03-23 RX ADMIN — Medication 600 MILLIGRAM(S): at 13:20

## 2022-03-23 RX ADMIN — Medication 975 MILLIGRAM(S): at 10:11

## 2022-03-23 RX ADMIN — Medication 975 MILLIGRAM(S): at 09:12

## 2022-03-23 NOTE — PROGRESS NOTE ADULT - SUBJECTIVE AND OBJECTIVE BOX
OB Postpartum Progress Note: PPD #1     32yo gHTN now PPD #1 after  seen and examined at bedside, no acute overnight events. Patient denies current complaints, her pain is well controlled. States she is ambulating, voiding spontaneously, passing gas, and tolerating regular diet. Denies CP, SOB, N/V, HA, blurred vision, epigastric pain.    Vital Signs Last 24 Hours  T(C): 36.6 (22 @ 06:16), Max: 37.4 (22 @ 15:29)  HR: 74 (22 @ 06:16) (54 - 95)  BP: 120/66 (22 @ 06:16) (104/55 - 146/78)  RR: 18 (22 @ 06:16) (16 - 19)  SpO2: 100% (22 @ 06:16) (94% - 100%)    Physical Exam:  General: NAD, resting comfortably in bed   Abdomen: Soft, non-tender, non-distended, fundus firm  Extremities: Full ROM, moving all extremities spontaneously, no edema  Pelvic: Lochia wnl    Labs:    Blood Type: A Positive  Antibody Screen: Negative  RPR: Negative               11.2   12.51 )-----------( 254      (  @ 15:00 )             32.2         MEDICATIONS  (STANDING):  acetaminophen     Tablet .. 975 milliGRAM(s) Oral <User Schedule>  diphtheria/tetanus/pertussis (acellular) Vaccine (ADAcel) 0.5 milliLiter(s) IntraMuscular once  ibuprofen  Tablet. 600 milliGRAM(s) Oral every 6 hours  oxytocin Infusion 333.333 milliUNIT(s)/Min (1000 mL/Hr) IV Continuous <Continuous>  oxytocin Infusion 333.333 milliUNIT(s)/Min (1000 mL/Hr) IV Continuous <Continuous>  oxytocin Infusion. 2 milliUNIT(s)/Min (2 mL/Hr) IV Continuous <Continuous>  prenatal multivitamin 1 Tablet(s) Oral daily  sodium chloride 0.9% lock flush 3 milliLiter(s) IV Push every 8 hours    MEDICATIONS  (PRN):  benzocaine 20%/menthol 0.5% Spray 1 Spray(s) Topical every 6 hours PRN for Perineal discomfort  dibucaine 1% Ointment 1 Application(s) Topical every 6 hours PRN Perineal discomfort  diphenhydrAMINE 25 milliGRAM(s) Oral every 6 hours PRN Pruritus  hydrocortisone 1% Cream 1 Application(s) Topical every 6 hours PRN Moderate Pain (4-6)  lanolin Ointment 1 Application(s) Topical every 6 hours PRN nipple soreness  magnesium hydroxide Suspension 30 milliLiter(s) Oral two times a day PRN Constipation  oxyCODONE    IR 5 milliGRAM(s) Oral every 3 hours PRN Moderate to Severe Pain (4-10)  oxyCODONE    IR 5 milliGRAM(s) Oral once PRN Moderate to Severe Pain (4-10)  pramoxine 1%/zinc 5% Cream 1 Application(s) Topical every 4 hours PRN Moderate Pain (4-6)  simethicone 80 milliGRAM(s) Chew every 4 hours PRN Gas  witch hazel Pads 1 Application(s) Topical every 4 hours PRN Perineal discomfort      
PP Day #2 s/p  w/ 1st Laceration & Lt. Periurethral tear.  As per Dr. Lyle pt. is cleared for d/c to home.   current OBhx: GHTN   -Pt. was seen & counselled regarding GHTN.  -BP cuff was declined by pt. verbalized she has her own & was self monitoring  during the pregnancy @ home.  -Instructed to take BP t.i.d & record BP's for OB review  -BP parameters of 140/90 to call OB. BP of  160/110 return to hospital.  -S&S of PEC was explained to pt. verbalized understanding.  -As per dr. Lyle, pt. should call to schedule  jess' t w/ Dr. Rodrigez  for 1 week BP check.      
Anesthesia Post-op Note    POD#1 S/P vaginal delivery    Patient is doing well.  OOBAA. Tolerating clears.  Pain is tolerable.  No residual anesthetic issues or complications noted.      Sarah Vang CRNA  
OB Progress Note:  PPD #2    S: 32yo PNC c/b gHTN now PPD#2 s/p . Patient feels well. Pain is well controlled. She is tolerating a regular diet and passing flatus. She is voiding spontaneously, and ambulating without difficulty. Denies CP/SOB. Denies lightheadedness/dizziness. Denies N/V.    O:  Vitals:  Vital Signs Last 24 Hrs  T(C): 36.6 (23 Mar 2022 05:49), Max: 37 (22 Mar 2022 10:21)  T(F): 97.9 (23 Mar 2022 05:49), Max: 98.6 (22 Mar 2022 10:21)  HR: 73 (23 Mar 2022 05:49) (68 - 81)  BP: 119/73 (23 Mar 2022 05:49) (109/57 - 119/73)  BP(mean): --  RR: 19 (23 Mar 2022 05:49) (17 - 19)  SpO2: 98% (23 Mar 2022 05:49) (98% - 100%)    MEDICATIONS  (STANDING):  acetaminophen     Tablet .. 975 milliGRAM(s) Oral <User Schedule>  diphtheria/tetanus/pertussis (acellular) Vaccine (ADAcel) 0.5 milliLiter(s) IntraMuscular once  ibuprofen  Tablet. 600 milliGRAM(s) Oral every 6 hours  prenatal multivitamin 1 Tablet(s) Oral daily  sodium chloride 0.9% lock flush 3 milliLiter(s) IV Push every 8 hours      Labs:  Blood type: A Positive  Rubella IgG: RPR: Negative                          11.2<L>   12.51<H> >-----------< 254    (  @ 15:00 )             32.2<L>    22 @ 15:00      135  |  103  |  8   ----------------------------<  88  4.1   |  18<L>  |  0.44<L>        Ca    9.8      21 Mar 2022 15:00    TPro  6.6  /  Alb  3.7  /  TBili  <0.2  /  DBili  x   /  AST  21  /  ALT  11  /  AlkPhos  146<H>  22 @ 15:00          Physical Exam:  General: NAD  Abdomen: Soft, non-tender, non-distended, fundus firm  Vaginal: Lochia wnl  Extremities: No erythema/edema

## 2022-03-23 NOTE — PROGRESS NOTE ADULT - ASSESSMENT
Assessment:   32yo gHTN now postpartum day 2 from a , recovering well.     Plan:   #gHTN  - met criteria inpatient  - HELLP wnl, P/C 0.2  - BPs have been wnl overnight  - cont to monitor in patient    #pp care  - Continue scheduled Ibuprofen and Acetaminophen for pain, Oxycodone available PRN for breakthrough pain.  - Increase ambulation, SCDs when not ambulating  - Continue regular diet    Amyeo Jereen, PGY-1  
  Assessment:   30yo gHTN now postpartum day 1 from a , recovering well.     Plan:   #gHTN  - met criteria inpatient  - HELLP wnl, P/C 0.2  - cont to monitor in patient    #pp care  - Continue scheduled Ibuprofen and Acetaminophen for pain, Oxycodone available PRN for breakthrough pain.  - Increase ambulation, SCDs when not ambulating  - Continue regular diet    Amyeo Jereen, PGY-1

## 2022-03-24 ENCOUNTER — NON-APPOINTMENT (OUTPATIENT)
Age: 32
End: 2022-03-24

## 2022-03-24 RX ORDER — PNV NO.95/FERROUS FUM/FOLIC AC 28MG-0.8MG
TABLET ORAL DAILY
Refills: 0 | Status: ACTIVE | COMMUNITY
Start: 2022-03-24

## 2022-03-24 RX ORDER — IBUPROFEN 200 MG/1
200 TABLET ORAL
Refills: 0 | Status: ACTIVE | COMMUNITY
Start: 2022-03-24

## 2022-03-24 RX ORDER — ACETAMINOPHEN 325 MG/1
325 TABLET ORAL
Refills: 0 | Status: ACTIVE | COMMUNITY
Start: 2022-03-24

## 2022-03-27 ENCOUNTER — NON-APPOINTMENT (OUTPATIENT)
Age: 32
End: 2022-03-27

## 2022-03-31 DIAGNOSIS — O13.9 GESTATIONAL [PREGNANCY-INDUCED] HYPERTENSION W/OUT SIGNIFICANT PROTEINURIA, UNSPECIFIED TRIMESTER: ICD-10-CM

## 2022-03-31 DIAGNOSIS — Z78.9 OTHER SPECIFIED HEALTH STATUS: ICD-10-CM

## 2022-03-31 DIAGNOSIS — F41.9 ANXIETY DISORDER, UNSPECIFIED: ICD-10-CM

## 2022-04-03 ENCOUNTER — NON-APPOINTMENT (OUTPATIENT)
Age: 32
End: 2022-04-03

## 2022-04-08 ENCOUNTER — APPOINTMENT (OUTPATIENT)
Dept: CARDIOLOGY | Facility: CLINIC | Age: 32
End: 2022-04-08

## 2022-04-10 ENCOUNTER — NON-APPOINTMENT (OUTPATIENT)
Age: 32
End: 2022-04-10

## 2022-04-20 ENCOUNTER — NON-APPOINTMENT (OUTPATIENT)
Age: 32
End: 2022-04-20

## 2022-10-05 ENCOUNTER — RESULT REVIEW (OUTPATIENT)
Age: 32
End: 2022-10-05

## 2024-09-21 ENCOUNTER — NON-APPOINTMENT (OUTPATIENT)
Age: 34
End: 2024-09-21

## 2024-09-24 ENCOUNTER — NON-APPOINTMENT (OUTPATIENT)
Age: 34
End: 2024-09-24

## 2024-09-25 ENCOUNTER — APPOINTMENT (OUTPATIENT)
Dept: ORTHOPEDIC SURGERY | Facility: CLINIC | Age: 34
End: 2024-09-25
Payer: COMMERCIAL

## 2024-09-25 VITALS — BODY MASS INDEX: 27.46 KG/M2 | HEIGHT: 63 IN | WEIGHT: 155 LBS

## 2024-09-25 DIAGNOSIS — S13.9XXA SPRAIN OF JOINTS AND LIGAMENTS OF UNSPECIFIED PARTS OF NECK, INITIAL ENCOUNTER: ICD-10-CM

## 2024-09-25 PROCEDURE — 99204 OFFICE O/P NEW MOD 45 MIN: CPT

## 2024-09-25 RX ORDER — DICLOFENAC SODIUM 75 MG/1
75 TABLET, DELAYED RELEASE ORAL
Qty: 60 | Refills: 1 | Status: ACTIVE | COMMUNITY
Start: 2024-09-25 | End: 1900-01-01

## 2024-09-25 NOTE — PHYSICAL EXAM
[Normal] : Gait: normal [Schilling's Sign] : negative Schilling's sign [Pronator Drift] : negative pronator drift [SLR] : negative straight leg raise [de-identified] : 5 out of 5 motor strength, sensation is intact and symmetrical full range of motion flexion extension and rotation, no palpatory tenderness full range of motion of hips knees shoulders and elbows (all four extremities), no atrophy, negative straight leg raise, no pathological reflexes, no swelling, normal ambulation, no apparent distress skin is intact, no palpable lymph nodes, no upper or lower extremity instability, alert and oriented x3 and normal mood. Normal finger-to nose test.  Cervical strain and sprain. No upper motor neuron findings. [de-identified] : 09/22/2024 CERVICAL SPINE XRAY ( four view minimum)  ([facility])  HISTORY: M54.2 Cervical/ Neck Pain AP, lateral, right and left oblique views and open-mouth views of the cervical spine are obtained. There is straightening of the normal cervical lordosis. The vertebral bodies are normal height. There is no evidence of a compression fracture. No osteoblastic or osteolytic lesions were identified. The disc spaces are intact. There is mild right foraminal narrowing C5-C6. The visualized soft tissues reveal no abnormalities. IMPRESSION: Straightening of the normal cervical lordosis. Mild right foraminal narrowing C3-C4

## 2024-09-25 NOTE — HISTORY OF PRESENT ILLNESS
[de-identified] : 34 year old RHD female with chronic neck and lower back pain presents for evaluation of neck pain since 9/21/24. She states that she has had pain intermittently over the years with recent exacerbation since this weekend. She states she picked up her daughter and felt the pain. She states she also started to develop pain radiating into the right trapezius and in the right forearm and wrist, with intermittent numbness/tingling as well. She states her right hand feels weak. Denies dropping items. Denies gait instability.  She took motrin for the pain. She also went to an urgent care for the pain and was prescribed tylenol.  She has tried PT and chiropractic care in the past, most recently over the summer intermittently. She states that it is helpful.  She also had trigger point injections with the chiropractor over the summer which also helped.  PMHx: denies significant medical history  Is an .  No fever chills sweats nausea vomiting no bowel or bladder dysfunction, no recent weight loss or gain no night pain. This history is in addition to the intake form that I personally reviewed.

## 2024-09-25 NOTE — ADDENDUM
[FreeTextEntry1] : This note was written by Romana Desir on 09/25/2024 acting as scribe for Dr. Gumaro Mcdonald M.D.  I, Gumaro Mcdonald MD, have read and attest that all the information, medical decision making and discharge instructions within are true and accurate.

## 2024-09-25 NOTE — DISCUSSION/SUMMARY
[de-identified] : Cervical strain and sprain Discussed all options. Diclofenac PRN Referral for physical therapy. Family hx of MS If no better will obtain MRI All options discussed including rest, medicine, home exercise, acupuncture, Chiropractic care, Physical Therapy, Pain management, and last resort surgery. All questions were answered, all alternatives discussed and the patient is in complete agreement with the treatment plan which the patient contributed to and discussed with me through the shared decision making process. Follow-up appointment as instructed. Any issues and the patient will call or come in sooner.

## 2024-10-16 ENCOUNTER — RESULT REVIEW (OUTPATIENT)
Age: 34
End: 2024-10-16

## 2025-01-08 ENCOUNTER — RX RENEWAL (OUTPATIENT)
Age: 35
End: 2025-01-08

## 2025-07-14 NOTE — OB RN TRIAGE NOTE - PRO INTERPRETER NEED 2
07/14/25 9:00 AM     Chart reviewed for Diabetic Eye Exam ; nothing is submitted to the patient's insurance at this time.     Karoline Cody MA   [facility]  
English

## 2025-07-16 ENCOUNTER — NON-APPOINTMENT (OUTPATIENT)
Age: 35
End: 2025-07-16

## 2025-07-18 ENCOUNTER — APPOINTMENT (OUTPATIENT)
Dept: ORTHOPEDIC SURGERY | Facility: CLINIC | Age: 35
End: 2025-07-18
Payer: COMMERCIAL

## 2025-07-18 PROBLEM — M54.2 CERVICALGIA: Status: ACTIVE | Noted: 2025-07-18

## 2025-07-18 PROCEDURE — 99214 OFFICE O/P EST MOD 30 MIN: CPT | Mod: 95
